# Patient Record
Sex: FEMALE | Race: WHITE | NOT HISPANIC OR LATINO | Employment: OTHER | URBAN - METROPOLITAN AREA
[De-identification: names, ages, dates, MRNs, and addresses within clinical notes are randomized per-mention and may not be internally consistent; named-entity substitution may affect disease eponyms.]

---

## 2022-07-11 ENCOUNTER — APPOINTMENT (OUTPATIENT)
Dept: RADIOLOGY | Facility: MEDICAL CENTER | Age: 67
End: 2022-07-11
Attending: EMERGENCY MEDICINE
Payer: COMMERCIAL

## 2022-07-11 ENCOUNTER — HOSPITAL ENCOUNTER (EMERGENCY)
Facility: MEDICAL CENTER | Age: 67
End: 2022-07-11
Attending: EMERGENCY MEDICINE
Payer: COMMERCIAL

## 2022-07-11 VITALS
BODY MASS INDEX: 23.92 KG/M2 | RESPIRATION RATE: 18 BRPM | WEIGHT: 130 LBS | SYSTOLIC BLOOD PRESSURE: 178 MMHG | HEART RATE: 70 BPM | OXYGEN SATURATION: 98 % | HEIGHT: 62 IN | TEMPERATURE: 99 F | DIASTOLIC BLOOD PRESSURE: 106 MMHG

## 2022-07-11 DIAGNOSIS — S82.841A BIMALLEOLAR ANKLE FRACTURE, RIGHT, CLOSED, INITIAL ENCOUNTER: ICD-10-CM

## 2022-07-11 PROCEDURE — A9270 NON-COVERED ITEM OR SERVICE: HCPCS | Performed by: EMERGENCY MEDICINE

## 2022-07-11 PROCEDURE — 73610 X-RAY EXAM OF ANKLE: CPT | Mod: RT

## 2022-07-11 PROCEDURE — 700102 HCHG RX REV CODE 250 W/ 637 OVERRIDE(OP): Performed by: EMERGENCY MEDICINE

## 2022-07-11 PROCEDURE — 99284 EMERGENCY DEPT VISIT MOD MDM: CPT | Mod: EDC

## 2022-07-11 PROCEDURE — 29515 APPLICATION SHORT LEG SPLINT: CPT | Mod: EDC

## 2022-07-11 RX ORDER — HYDROCODONE BITARTRATE AND ACETAMINOPHEN 5; 325 MG/1; MG/1
1-2 TABLET ORAL EVERY 6 HOURS PRN
Qty: 15 TABLET | Refills: 0 | Status: SHIPPED | OUTPATIENT
Start: 2022-07-11 | End: 2022-07-14

## 2022-07-11 RX ORDER — HYDROCODONE BITARTRATE AND ACETAMINOPHEN 5; 325 MG/1; MG/1
2 TABLET ORAL ONCE
Status: COMPLETED | OUTPATIENT
Start: 2022-07-11 | End: 2022-07-11

## 2022-07-11 RX ADMIN — HYDROCODONE BITARTRATE AND ACETAMINOPHEN 2 TABLET: 5; 325 TABLET ORAL at 16:46

## 2022-07-11 ASSESSMENT — LIFESTYLE VARIABLES
DOES PATIENT WANT TO STOP DRINKING: NO
EVER FELT BAD OR GUILTY ABOUT YOUR DRINKING: NO
TOTAL SCORE: 0
CONSUMPTION TOTAL: INCOMPLETE
TOTAL SCORE: 0
TOTAL SCORE: 0
DO YOU DRINK ALCOHOL: NO
EVER HAD A DRINK FIRST THING IN THE MORNING TO STEADY YOUR NERVES TO GET RID OF A HANGOVER: NO
HAVE YOU EVER FELT YOU SHOULD CUT DOWN ON YOUR DRINKING: NO
HAVE PEOPLE ANNOYED YOU BY CRITICIZING YOUR DRINKING: NO

## 2022-07-11 NOTE — ED NOTES
Pt has notable swelling to the right lateral ankle, CMS+, ice pack was provided by the triage nurse. Aware to remain NPO.

## 2022-07-11 NOTE — ED PROVIDER NOTES
ED Provider Note    CHIEF COMPLAINT  Chief Complaint   Patient presents with   • Ankle Injury     Pt reports that she did not see the curb and she rolled her right ankle and fell down. Pt also has laceration to left palm. Pt took ibuprofen PTA       HPI  Kandace Marsh is a 66 y.o. female who presents for evaluation of acute injury to the right ankle.  The patient is accompanied by her  and grandchildren.  She apparently was walking with him and did not see the curb.  She twisted her right ankle and came down on her left hand.  She also struck her left knee.  She denies any significant pain to the left knee, left hand or wrist.  She does report significant pain and swelling and inability to bear weight with profound swelling to the lateral malleolus of the right ankle.  Injury occurred within the last 2 hours.  No head injury neck or back pain.  No numbness weakness or tingling.  She does not take any blood thinners.    REVIEW OF SYSTEMS  See HPI for further details.  No loss of consciousness numbness weakness or tingling all other systems are negative.     PAST MEDICAL HISTORY  No past medical history on file.  Hypertension  FAMILY HISTORY  Noncontributory    SOCIAL HISTORY  Social History     Socioeconomic History   • Marital status:    Tobacco Use   • Smoking status: Never Smoker   • Smokeless tobacco: Never Used   Vaping Use   • Vaping Use: Never used   Substance and Sexual Activity   • Alcohol use: Yes   • Drug use: Not Currently     , social alcohol  SURGICAL HISTORY  No past surgical history on file.  No major surgeries  CURRENT MEDICATIONS  Home Medications     Reviewed by Moni Vásquez R.N. (Registered Nurse) on 07/11/22 at 1644  Med List Status: Partial   Medication Last Dose Status   LOSARTAN POTASSIUM PO  Active   NON SPECIFIED  Active                ALLERGIES  No Known Allergies    PHYSICAL EXAM  VITAL SIGNS: BP (!) 178/106   Pulse (!) 109   Temp 36.1 °C (96.9 °F)  "(Temporal)   Resp 18   Ht 1.575 m (5' 2\")   Wt 59 kg (130 lb)   SpO2 96%   BMI 23.78 kg/m²       Constitutional: Well developed, Well nourished, No acute distress, Non-toxic appearance.   HENT: Normocephalic, Atraumatic, Bilateral external ears normal, Oropharynx moist, No oral exudates, Nose normal.   Eyes: PERRLA, EOMI, Conjunctiva normal, No discharge.   Neck: Normal range of motion, No tenderness, Supple, No stridor.   Cardiovascular: Normal heart rate, Normal rhythm, No murmurs, No rubs, No gallops.   Thorax & Lungs: Normal breath sounds, No respiratory distress, No wheezing, No chest tenderness.   Abdomen: Bowel sounds normal, Soft, No tenderness, No masses, No pulsatile masses.   Skin: Warm, Dry, No erythema, No rash.   Back: No tenderness, No CVA tenderness.   Extremities: Right lower extremity exam is notable for profound tenderness and soft tissue swelling to the lateral malleolus.  No midfoot pain or instability.  Neurovascular exam is normal.  Compartments are soft in the right leg no tenderness over the fibular head.  Superficial abrasion on the anterior left knee without bony tenderness overlying the patella.  No effusion.  Superficial abrasion noted to the palm of the left hand around 5 x 5 mm nonsuturable.  No bony tenderness no foreign body.   Neurologic: Alert & oriented x 3, Normal motor function, Normal sensory function, No focal deficits noted.   Psychiatric: Anxious    DX-ANKLE 3+ VIEWS RIGHT   Final Result      1.  There is a bimalleolar right ankle fracture with predominantly lateral soft tissue swelling.           physician procedure.  Complex right ankle splint application.  Verbal consent was obtained.  A 2 layer roll of free wrap was applied to the right lower extremity up to the knee.  I then applied a refitted Ortho-Glass stirrup as well as a posterior element.  This was applied exclusively by myself.  It was wrapped and sent with Ace bandages.  No complications    COURSE & MEDICAL " DECISION MAKING  Pertinent Labs & Imaging studies reviewed. (See chart for details)  Patient was offered parenteral pain medication but declined.  He was given 2 oral Percocet.  Here she has a nondisplaced bimalleolar ankle fracture.  There does not appear to be significant mortise disruption.  Reviewed the radiographs and clinical presentation with Dr. Chaves with orthopedics.  The patient lives in [place].  Dr. Doss and I both agree that this does not require emergency fixation or operative intervention.  We will place the patient in a bulky 3-way splint.  We will provide crutch training.  I will provide the patient with a small prescription of opioids.  We will also provide her with a copy of her x-rays.  She will need urgent follow-up in [place] with her orthopedist of choice    FINAL IMPRESSION  1.  Acute right closed nondisplaced bimalleolar ankle fracture         Electronically signed by: Felton Noel M.D., 7/11/2022 4:58 PM

## 2022-07-11 NOTE — ED TRIAGE NOTES
"Chief Complaint   Patient presents with   • Ankle Injury     Pt reports that she did not see the curb and she rolled her right ankle and fell down. Pt also has laceration to left palm. Pt took ibuprofen PTA     BP (!) 178/106   Pulse (!) 109   Temp 36.1 °C (96.9 °F) (Temporal)   Resp 18   Ht 1.575 m (5' 2\")   Wt 59 kg (130 lb)   SpO2 96%   BMI 23.78 kg/m²     Pt is wheeled in and out of triage in a hospital wheelchair. Appropriate PPE worn throughout entire encounter. Pt placed back in the lobby and educated about triage process.    "